# Patient Record
Sex: FEMALE | Race: WHITE | Employment: FULL TIME | URBAN - METROPOLITAN AREA
[De-identification: names, ages, dates, MRNs, and addresses within clinical notes are randomized per-mention and may not be internally consistent; named-entity substitution may affect disease eponyms.]

---

## 2018-05-12 ENCOUNTER — OFFICE VISIT (OUTPATIENT)
Dept: ORTHOPEDIC SURGERY | Age: 48
End: 2018-05-12

## 2018-05-12 VITALS
WEIGHT: 220 LBS | SYSTOLIC BLOOD PRESSURE: 130 MMHG | DIASTOLIC BLOOD PRESSURE: 80 MMHG | HEIGHT: 64 IN | BODY MASS INDEX: 37.56 KG/M2 | HEART RATE: 81 BPM

## 2018-05-12 DIAGNOSIS — S16.1XXA STRAIN OF NECK MUSCLE, INITIAL ENCOUNTER: ICD-10-CM

## 2018-05-12 DIAGNOSIS — M54.2 CERVICAL SPINE PAIN: Primary | ICD-10-CM

## 2018-05-12 PROCEDURE — 99203 OFFICE O/P NEW LOW 30 MIN: CPT | Performed by: NURSE PRACTITIONER

## 2018-05-12 RX ORDER — METHYLPREDNISOLONE 4 MG/1
TABLET ORAL
Qty: 1 KIT | Refills: 0 | Status: SHIPPED | OUTPATIENT
Start: 2018-05-12 | End: 2018-05-18

## 2018-05-12 RX ORDER — MELOXICAM 7.5 MG/1
7.5 TABLET ORAL DAILY
Qty: 30 TABLET | Refills: 0 | Status: SHIPPED | OUTPATIENT
Start: 2018-05-12

## 2018-05-12 RX ORDER — CYCLOBENZAPRINE HCL 5 MG
5 TABLET ORAL 3 TIMES DAILY PRN
Qty: 30 TABLET | Refills: 0 | Status: SHIPPED | OUTPATIENT
Start: 2018-05-12 | End: 2018-05-22

## 2018-05-12 RX ORDER — IBUPROFEN 200 MG
200 TABLET ORAL
COMMUNITY

## 2018-05-30 ENCOUNTER — OFFICE VISIT (OUTPATIENT)
Dept: ORTHOPEDIC SURGERY | Age: 48
End: 2018-05-30

## 2018-05-30 VITALS
SYSTOLIC BLOOD PRESSURE: 144 MMHG | WEIGHT: 220.02 LBS | DIASTOLIC BLOOD PRESSURE: 97 MMHG | HEIGHT: 64 IN | BODY MASS INDEX: 37.56 KG/M2 | HEART RATE: 77 BPM

## 2018-05-30 DIAGNOSIS — M50.30 DDD (DEGENERATIVE DISC DISEASE), CERVICAL: Primary | ICD-10-CM

## 2018-05-30 DIAGNOSIS — M54.12 CERVICAL RADICULITIS: ICD-10-CM

## 2018-05-30 PROCEDURE — 99214 OFFICE O/P EST MOD 30 MIN: CPT | Performed by: PHYSICIAN ASSISTANT

## 2018-05-30 RX ORDER — GABAPENTIN 300 MG/1
CAPSULE ORAL
Qty: 30 CAPSULE | Refills: 0 | Status: SHIPPED | OUTPATIENT
Start: 2018-05-30 | End: 2018-06-30

## 2018-05-30 RX ORDER — PREDNISONE 10 MG/1
TABLET ORAL
Qty: 26 TABLET | Refills: 0 | Status: SHIPPED | OUTPATIENT
Start: 2018-05-30

## 2018-06-01 ENCOUNTER — TELEPHONE (OUTPATIENT)
Dept: ORTHOPEDIC SURGERY | Age: 48
End: 2018-06-01

## 2018-06-01 ENCOUNTER — HOSPITAL ENCOUNTER (OUTPATIENT)
Dept: PHYSICAL THERAPY | Age: 48
Discharge: HOME | End: 2018-06-30
Attending: PHYSICIAN ASSISTANT | Admitting: PHYSICIAN ASSISTANT

## 2018-06-07 ENCOUNTER — HOSPITAL ENCOUNTER (OUTPATIENT)
Dept: PHYSICAL THERAPY | Age: 48
Discharge: HOME | End: 2018-05-31
Admitting: PHYSICIAN ASSISTANT

## 2018-06-11 ENCOUNTER — OFFICE VISIT (OUTPATIENT)
Dept: ORTHOPEDIC SURGERY | Age: 48
End: 2018-06-11

## 2018-06-11 VITALS
WEIGHT: 220.02 LBS | BODY MASS INDEX: 37.56 KG/M2 | HEART RATE: 69 BPM | SYSTOLIC BLOOD PRESSURE: 150 MMHG | DIASTOLIC BLOOD PRESSURE: 91 MMHG | HEIGHT: 64 IN

## 2018-06-11 DIAGNOSIS — M50.00 HNP (HERNIATED NUCLEUS PULPOSUS) WITH MYELOPATHY, CERVICAL: Primary | ICD-10-CM

## 2018-06-11 DIAGNOSIS — M54.12 CERVICAL RADICULITIS: ICD-10-CM

## 2018-06-11 PROCEDURE — 99214 OFFICE O/P EST MOD 30 MIN: CPT | Performed by: PHYSICIAN ASSISTANT

## 2018-06-11 RX ORDER — MELOXICAM 15 MG/1
15 TABLET ORAL DAILY
Qty: 30 TABLET | Refills: 1 | Status: SHIPPED | OUTPATIENT
Start: 2018-06-11

## 2018-06-19 ENCOUNTER — HOSPITAL ENCOUNTER (OUTPATIENT)
Dept: PHYSICAL THERAPY | Age: 48
Discharge: HOME | End: 2018-06-20
Admitting: PHYSICIAN ASSISTANT

## 2018-06-26 ENCOUNTER — HOSPITAL ENCOUNTER (OUTPATIENT)
Dept: PHYSICAL THERAPY | Age: 48
Discharge: HOME | End: 2018-06-27
Admitting: PHYSICIAN ASSISTANT

## 2018-07-01 ENCOUNTER — HOSPITAL ENCOUNTER (OUTPATIENT)
Dept: PHYSICAL THERAPY | Age: 48
Discharge: HOME | End: 2018-07-01
Attending: PHYSICIAN ASSISTANT | Admitting: PHYSICIAN ASSISTANT

## 2018-07-05 ENCOUNTER — HOSPITAL ENCOUNTER (OUTPATIENT)
Dept: PHYSICAL THERAPY | Age: 48
Discharge: HOME | End: 2018-07-06
Admitting: PHYSICIAN ASSISTANT

## 2018-07-05 NOTE — FLOWSHEET NOTE
[facility], [address]  Phone: [phone]  Fax [phone]      Physical Therapy Daily Treatment Note  Date:  2018    Patient Name:  Vlad Way    :  1970  MRN: [id number]  Restrictions/Precautions:    Medical/Treatment Diagnosis Information:  · Diagnosis: Cervical radiculopathy (M54.12)  · Treatment Diagnosis: Cervical pain (C22.9)  Insurance/Certification information:  PT Insurance Information: [place]  Physician Information:  Referring Practitioner: Dr. Bee Roblero of care signed (Y/N):     Date of Patient follow up with Physician:     G-Code (if applicable):      Date G-Code Applied:  18       Progress Note: [x]  Yes  []  No  Next due by: Visit #10      Latex Allergy:  [x]NO      []YES  Preferred Language for Healthcare:   [x]English       []other:    Visit # Insurance Allowable Requires auth   3 4 visits -    []no        [x]yes: orthonet **check nv**     Pain level:  0/10     SUBJECTIVE:  Patient reports she feels good. Not having any pain in her neck or any symptoms in her arm. OBJECTIVE:   Observation: Patient given updated HEP this date.  See below  Test measurements:  NT this date    RESTRICTIONS/PRECAUTIONS: none    Exercises/Interventions:   Therapeutic Ex Sets/sec Reps Notes   HEP) (HEP) (HEP)   Supine chin tuck (HEP) 5\" 20    Supine chin tuck and lift (HEP) 2 5 Needs cueing               No $ 3\" 20x                TB rows 2 10 RTB   TB ext 2 10 YTB   Finisher ladders  Finisher 1/ arc  1  1 15 ea R/L  15 ea R/L          t spine ext over chair 5\" 10x    Doorway pec stretch 30\" 3          Manual Intervention      STM left upper trap, SOB, gentle manual traction, cervical side glides 15'                                         NMR re-education                                    Traction                      Therapeutic Exercise and NMR EXR  [x] (08113)

## 2024-03-19 ENCOUNTER — PROCEDURE VISIT (OUTPATIENT)
Dept: SURGERY | Age: 54
End: 2024-03-19

## 2024-03-19 VITALS — DIASTOLIC BLOOD PRESSURE: 96 MMHG | HEART RATE: 78 BPM | SYSTOLIC BLOOD PRESSURE: 160 MMHG

## 2024-03-19 DIAGNOSIS — C44.319 BASAL CELL CARCINOMA OF LEFT CHEEK: Primary | ICD-10-CM

## 2024-03-19 PROCEDURE — 17311 MOHS 1 STAGE H/N/HF/G: CPT | Performed by: DERMATOLOGY

## 2024-03-19 PROCEDURE — 12052 INTMD RPR FACE/MM 2.6-5.0 CM: CPT | Performed by: DERMATOLOGY

## 2024-03-19 RX ORDER — MOMETASONE FUROATE 50 UG/1
2 SPRAY, METERED NASAL DAILY
COMMUNITY

## 2024-03-19 NOTE — PROGRESS NOTES
MOHS PROCEDURE NOTE    PHYSICIAN:  Sonia Barrios MD, Who operated in two distinct and integrated capacities as the surgeon removing the tissue and as the pathologist examining the tissue.    ASSISTANT: Lydia Tariq RN     REFERRING PROVIDER:  Arline Joshua MD    PREOPERATIVE DIAGNOSIS: Nodular Basal Cell Carcinoma     SPECIFIC MOHS INDICATIONS:  location and need for tissue conservation    AUC SCORIN/9    POSTOPERATIVE DIAGNOSIS: SAME    LOCATION: Left superior central malar cheek    OPERATIVE PROCEDURE:  MOHS MICROGRAPHIC SURGERY    RECONSTRUCTION OF DEFECT: Intermediate layered closure    PREOPERATIVE SIZE: 7x4 MM    DEFECT SIZE: 9x8 MM    LENGTH OF REPAIRED WOUND/SIZE OF FLAP/SIZE OF GRAFT:  26 MM    ANESTHESIA:  3mL 1% lidocaine with epinephrine 1:100,000 buffered.     EBL:  MINIMAL    DURATION OF PROCEDURE:  1 HOUR    POSTOPERATIVE OBSERVATION: 45 MINUTES     SPECIMENS:  SEE MOHS MAP    COMPLICATIONS:  NONE    DESCRIPTION OF PROCEDURE:  The patient was given a mirror, as appropriate, and the biopsy site was identified, marked with a surgical marking pen, and verified by the patient.   Options for treatment were discussed and the patient was informed that Mohs surgery was the selected treatment based on its lower recurrence rate, given the features listed above, as compared to other treatment modalities such as excision, radiation, or curettage, and agreed with this treatment plan.  Risks and benefits including bruising, swelling, bleeding, infection, nerve injury, recurrence, and scarring were discussed with the patient prior to the procedure and a written consent detailing these and other risks was reviewed with the patient and signed.    There was a time out for person and procedure verification.  The surgical site was prepped with an antiseptic solution.  Application of an antiseptic solution was repeated before each surgical stage.      Stage I:  The clinically-apparent tumor was carefully

## 2024-03-19 NOTE — PROGRESS NOTES
PRE-PROCEDURE SCREENING    Pacemaker/ICD: No  Difficulty with numbing in the past: No  Local Anesthesia Reaction/passing out: No  Latex or adhesive allergy:  No  Any history of reaction to suture or skin glue:  no  Bleeding/Clotting Disorders: Yes, factor 5  Anticoagulant Therapy: No  Joint prosthesis: No  Artificial Heart Valve: No  Stroke or Seizures: No  Organ Transplant or Lymphoma: No  Immunosuppression: No  Respiratory Problems: No

## 2024-03-19 NOTE — PATIENT INSTRUCTIONS
massage the area with a moisturizer, petroleum jelly (Vaseline) or Aquaphor. This helps to soften the scar tissue. You can begin this 1 month after the day of your surgery.  -A Silicone scar gel product may also be beneficial in regards to scar appearance. This can be used but is not usually necessary.  -The color of the scar will even out over time, but may remain pink for several months.     Call us at [phone] right away if you have any of the following symptoms:  -Bleeding that you cannot stop (see highlighted area above)   -Pain that lasts longer than 48 hours  -Your wound becomes more painful, red or hot to touch  -Bruising and swelling that does not begin to improve within the 48 hours or gets worse suddenly.

## 2024-03-20 ENCOUNTER — TELEPHONE (OUTPATIENT)
Dept: SURGERY | Age: 54
End: 2024-03-20

## 2024-03-20 NOTE — TELEPHONE ENCOUNTER
The patient was in the office on 3/19/2024 for mohs located on the left superior central malar cheek with ILC repair.  The patient tolerated the procedure well and left the office in good condition.    Pain level on post-operative day 1:  none    Any bleeding episode that required pressure to be held, bandage change or a call to the office or MD?  no     Any other issues?:  no    A post-operative telephone call was placed at 4:11pm in order to check on the patient's recovery process.  The patient reported doing well and had no complaints other than those listed above, if any.  All of the patient's questions were answered.     What Type Of Note Output Would You Prefer (Optional)?: Bullet Format Is The Patient Presenting As Previously Scheduled?: Yes How Severe Is Your Rash?: mild Is This A New Presentation, Or A Follow-Up?: Follow Up Rash Additional History: 08/14/18 ZACHARY